# Patient Record
Sex: FEMALE | Race: WHITE | NOT HISPANIC OR LATINO | URBAN - METROPOLITAN AREA
[De-identification: names, ages, dates, MRNs, and addresses within clinical notes are randomized per-mention and may not be internally consistent; named-entity substitution may affect disease eponyms.]

---

## 2017-12-28 ENCOUNTER — OFFICE VISIT - HEALTHEAST (OUTPATIENT)
Dept: FAMILY MEDICINE | Facility: CLINIC | Age: 22
End: 2017-12-28

## 2017-12-28 DIAGNOSIS — A08.4 VIRAL GASTROENTERITIS: ICD-10-CM

## 2019-07-08 ENCOUNTER — OFFICE VISIT - HEALTHEAST (OUTPATIENT)
Dept: FAMILY MEDICINE | Facility: CLINIC | Age: 24
End: 2019-07-08

## 2019-07-08 ENCOUNTER — COMMUNICATION - HEALTHEAST (OUTPATIENT)
Dept: FAMILY MEDICINE | Facility: CLINIC | Age: 24
End: 2019-07-08

## 2019-07-08 DIAGNOSIS — R30.0 DYSURIA: ICD-10-CM

## 2019-07-08 DIAGNOSIS — N30.01 ACUTE CYSTITIS WITH HEMATURIA: ICD-10-CM

## 2019-07-08 LAB
BACTERIA #/AREA URNS HPF: ABNORMAL HPF
RBC #/AREA URNS AUTO: >100 HPF
SQUAMOUS #/AREA URNS AUTO: ABNORMAL LPF
WBC #/AREA URNS AUTO: ABNORMAL HPF

## 2019-07-09 LAB — BACTERIA SPEC CULT: NORMAL

## 2019-12-07 ENCOUNTER — OFFICE VISIT - HEALTHEAST (OUTPATIENT)
Dept: FAMILY MEDICINE | Facility: CLINIC | Age: 24
End: 2019-12-07

## 2019-12-07 DIAGNOSIS — R07.0 THROAT PAIN: ICD-10-CM

## 2019-12-07 DIAGNOSIS — K21.9 GASTROESOPHAGEAL REFLUX DISEASE, ESOPHAGITIS PRESENCE NOT SPECIFIED: ICD-10-CM

## 2019-12-07 DIAGNOSIS — R06.02 SOB (SHORTNESS OF BREATH): ICD-10-CM

## 2019-12-07 DIAGNOSIS — K20.90 ESOPHAGITIS, UNSPECIFIED: ICD-10-CM

## 2019-12-07 DIAGNOSIS — J20.9 ACUTE BRONCHITIS, UNSPECIFIED ORGANISM: ICD-10-CM

## 2019-12-07 LAB — DEPRECATED S PYO AG THROAT QL EIA: NORMAL

## 2019-12-07 RX ORDER — FAMOTIDINE 20 MG/1
20 TABLET, FILM COATED ORAL 2 TIMES DAILY
Qty: 60 TABLET | Refills: 2 | Status: SHIPPED | OUTPATIENT
Start: 2019-12-07

## 2019-12-08 LAB — GROUP A STREP BY PCR: NORMAL

## 2021-05-30 NOTE — TELEPHONE ENCOUNTER
New medication orders signed.    Medication Question or Clarification  Who is calling: Patient  What medication are you calling about? (include dose and sig)    Disp Refills Start End    nitrofurantoin, macrocrystal-monohydrate, (MACROBID) 100 MG capsule 14 capsule 0 7/8/2019 7/15/2019    Sig - Route: Take 1 capsule (100 mg total) by mouth 2 (two) times a day for 7 days. - Oral    Sent to pharmacy as: nitrofurantoin, macrocrystal-monohydrate, (MACROBID) 100 MG capsule    E-Prescribing Status: Receipt confirmed by pharmacy (7/8/2019  1:16 PM CDT)      Disp Refills Start End     phenazopyridine (PYRIDIUM) 200 MG tablet 6 tablet 0 7/8/2019 7/11/2019    Sig - Route: Take 1 tablet (200 mg total) by mouth 3 (three) times a day as needed for pain. - Oral    Sent to pharmacy as: phenazopyridine (PYRIDIUM) 200 MG tablet    E-Prescribing Status: Receipt confirmed by pharmacy (7/8/2019  1:16 PM CDT      Who prescribed the medication?: Dr. Kenan Mullen  What is your question/concern?: Patient stated her insurance is not covered at [facility] and she needs her prescriptions above, [facility]. Please send these in as soon as possible. Patient will be heading [facility] right now.  [facility]: [id number]  Okay to leave a detailed message?: No  Site CMT - Please call the pharmacy to obtain any additional needed information.

## 2021-05-31 VITALS — WEIGHT: 160.4 LBS

## 2021-06-03 VITALS — WEIGHT: 159 LBS

## 2021-06-04 VITALS
OXYGEN SATURATION: 96 % | TEMPERATURE: 98.6 F | SYSTOLIC BLOOD PRESSURE: 114 MMHG | DIASTOLIC BLOOD PRESSURE: 73 MMHG | HEART RATE: 92 BPM | RESPIRATION RATE: 20 BRPM | WEIGHT: 156.06 LBS

## 2021-06-15 NOTE — PROGRESS NOTES
Chief Complaint   Patient presents with     Emesis     vomiting up food and water        HPI     Danica Soto is a 22 y.o. female seen today for vomiting and diarrhea.  Yesterday morning she began vomiting, reports 10 - 15 episodes during the day, always clear/yellow. Solitary episode of diarrhea yesterday, none since. She denies cough, shortness of breath, chest pain, headaches. A little bit of abdominal pain yesterday, correlated with emesis. No pain today. Still not eating/drinking today.  Her son also has similar symptoms.  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    No current outpatient prescriptions on file.     No current facility-administered medications for this visit.         Reviewed and updated: medical history, medications and allergies.     Review of Systems     General: Denies fever, chills, fatigue.  Cardiovascular: Denies chest pain, dyspnea on exertion, palpitations.  Respiratory: Denies dyspnea, cough, wheezing.  GI: Denies nausea, vomiting, diarrhea, constipation.  : Denies dysuria, polyuria.     Objective     Vitals:    12/28/17 1315   BP: 100/60   Pulse: 83   Temp: 98.5  F (36.9  C)   TempSrc: Oral   SpO2: 99%   Weight: 160 lb 6.4 oz (72.8 kg)        Reviewed vital signs.  General: Appears calm, comfortable. Answers questions quickly and appropriately with clear speech. No apparent distress.  Skin: Pink, warm, dry.  HENT: Normocephalic, atraumatic. TMs clear bilaterally. No lymphadenopathy.  Neck: Supple, without lymphadenopathy or thyromegaly.  Heart: Regular rate and rhythm, clear S1/S2 without murmur, rub, or gallop.  Lungs: Clear and equal bilaterally. Normal respiratory effort.  Neuro: Cranial nerves II - XII grossly intact. Memory and cognition appear normal. Normal gait.  Psych: Mood and affect appear normal.     No results found for this or any previous visit.       Medical Decision-Making     This otherwise healthy 22-year-old  female presents with less than 24 hours of vomiting with one episode of diarrhea.  Absence of a fever and a completely benign abdominal exam is reassuring.  Think this is most likely a viral gastroenteritis, and we will pursue supportive and symptomatic treatment for now.  Discussed signs that should have her return to the clinic, she expressed understanding and appeared comfortable with this plan.     Assessment and Plan     Danica was seen today for emesis.    Diagnoses and all orders for this visit:    Viral gastroenteritis    Other orders  -     Cancel: Influenza A/B Rapid Test        Patient Instructions   Please return to the clinic if you notice any of the following:    High fever (> 104F)    Shortness of breath    More diarrhea, especially if there's any blood in it (black or red).      Discussed benefit vs risk of medications, dosing, side effects.  Patient was able to verbalize understanding.  After visit summary was provided for patient.     Rj Gonzalez PA-C

## 2021-06-17 NOTE — PATIENT INSTRUCTIONS - HE
Patient Instructions by Kenan Mullen DO at 12/7/2019 11:30 AM     Author: Kenan Mullen DO Service: -- Author Type: Physician    Filed: 12/7/2019 12:43 PM Encounter Date: 12/7/2019 Status: Addendum    : Kenan Mullen DO (Physician)    Related Notes: Original Note by Kenan Mullen DO (Physician) filed at 12/7/2019 12:43 PM       I think your acid reflux has caused the asthma like symptoms, and throat discomfort. See hand out for treatment advice. If you still need treatment over three months, or not doing better in 3 days, be seen again.     Patient Education     GERD (Adult)    The esophagus is a tube that carries food from the mouth to the stomach. A valve at the lower end of the esophagus prevents stomach acid from flowing upward. When this valve doesn't work properly, stomach contents may repeatedly flow back up (reflux) into the esophagus. This is called gastroesophageal reflux disease (GERD). GERD can irritate the esophagus. It can cause problems with swallowing or breathing. In severe cases, GERD can cause recurrent pneumonia or other serious problems.  Symptoms of reflux include burning, pressure or sharp pain in the upper abdomen or mid to lower chest. The pain can spread to the neck, back, or shoulder. There may be belching, an acid taste in the back of the throat, chronic cough, or sore throat or hoarseness. GERD symptoms often occur during the day after a big meal. They can also occur at night when lying down.   Home care  Lifestyle changes can help reduce symptoms. If needed, medicines may be prescribed. Symptoms often improve with treatment, but if treatment is stopped, the symptoms often return after a few months. So most persons with GERD will need to continue treatment.  Lifestyle changes    Limit or avoid fatty, fried, and spicy foods, as well as coffee, chocolate, mint, and foods with high acid content such as tomatoes and citrus fruit and juices (orange, grapefruit, lemon).    Dont  "eat large meals, especially at night. Frequent, smaller meals are best. Do not lie down right after eating. And dont eat anything 3 hours before going to bed.    Avoid drinking alcohol and smoking. As much as possible, stay away from second hand smoke.    If you are overweight, losing weight will reduce symptoms.     Avoid wearing tight clothing around your stomach area.    If your symptoms occur during sleep, use a foam wedge to elevate your upper body (not just your head.) Or, place 4\" blocks under the head of your bed.  Medicines  If needed, medicines can help relieve the symptoms of GERD and prevent damage to the esophagus. Discuss a medicine plan with your healthcare provider. This may include one or more of the following medicines:    Antacids to help neutralize the normal acids in your stomach.    Acid blockers (H2 blockers) to decrease acid production.    Acid inhibitors (PPIs) to decrease acid production in a different way than the blockers. They may work better, but can take a little longer to take effect.  Take an antacid 30-60 minutes after eating and at bedtime, but not at the same time as an acid blocker.  Try not to take medicines such as ibuprofen and aspirin. If you are taking aspirin for your heart or other medical reasons, talk to your healthcare provider about stopping it.  Follow-up care  Follow up with your healthcare provider or as advised by our staff.  When to seek medical advice  Call your healthcare provider if any of the following occur:    Stomach pain gets worse or moves to the lower right abdomen (appendix area)    Chest pain appears or gets worse, or spreads to the back, neck, shoulder, or arm    Frequent vomiting (cant keep down liquids)    Blood in the stool or vomit (red or black in color)    Feeling weak or dizzy    Fever of 100.4 F (38 C) or higher, or as directed by your healthcare provider  Date Last Reviewed: 6/23/2015 2000-2017 [employer]. [address], [place] [address]. All rights reserved. This information is not intended as a substitute for professional medical care. Always follow your healthcare professional's instructions.

## 2021-06-17 NOTE — PATIENT INSTRUCTIONS - HE
"Patient Instructions by Kenan Mullen DO at 7/8/2019 12:20 PM     Author: Kenan Mullen DO Service: -- Author Type: Physician    Filed: 7/8/2019  1:17 PM Encounter Date: 7/8/2019 Status: Addendum    : Kenan Mullen DO (Physician)    Related Notes: Original Note by Kenan Mullen DO (Physician) filed at 7/8/2019  1:17 PM       See hand out for treatment advice. We will contact you if the urine culture shows a change in treatment is needed. If you are doing better, I think you should finish the treatment even if the culture result is negative.    Patient Education     Bladder Infection, Female (Adult)    Urine is normally doesn't have any bacteria in it. But bacteria can get into the urinary tract from the skin around the rectum. Or they can travel in the blood from elsewhere in the body. Once they are in your urinary tract, they can cause infection in the urethra (urethritis), the bladder (cystitis), or the kidneys (pyelonephritis).  The most common place for an infection is in the bladder. This is called a bladder infection. This is one of the most common infections in women. Most bladder infections are easily treated. They are not serious unless the infection spreads to the kidney.  The phrases \"bladder infection,\" \"UTI,\" and \"cystitis\" are often used to describe the same thing. But they are not always the same. Cystitis is an inflammation of the bladder. The most common cause of cystitis is an infection.  Symptoms  The infection causes inflammation in the urethra and bladder. This causes many of the symptoms. The most common symptoms of a bladder infection are:    Pain or burning when urinating    Having to urinate more often than usual    Urgent need to urinate    Only a small amount of urine comes out    Blood in urine    Abdominal discomfort. This is usually in the lower abdomen above the pubic bone.    Cloudy urine    Strong- or bad-smelling urine    Unable to urinate (urinary retention)    Unable " to hold urine in (urinary incontinence)    Fever    Loss of appetite    Confusion (in older adults)  Causes  Bladder infections are not contagious. You can't get one from someone else, from a toilet seat, or from sharing a bath.  The most common cause of bladder infections is bacteria from the bowels. The bacteria get onto the skin around the opening of the urethra. From there, they can get into the urine and travel up to the bladder, causing inflammation and infection. This usually happens because of:    Wiping improperly after urinating. Always wipe from front to back.    Bowel incontinence    Pregnancy    Procedures such as having a catheter inserted    Older age    Not emptying your bladder. This can allow bacteria a chance to grow in your urine.    Dehydration    Constipation    Sex    Use of a diaphragm for birth control   Treatment  Bladder infections are diagnosed by a urine test. They are treated with antibiotics and usually clear up quickly without complications. Treatment helps prevent a more serious kidney infection.  Medicines  Medicines can help in the treatment of a bladder infection:    Take antibiotics until they are used up, even if you feel better. It is important to finish them to make sure the infection has cleared.    You can use acetaminophen or ibuprofen for pain, fever, or discomfort, unless another medicine was prescribed. If you have chronic liver or kidney disease, talk with your healthcare provider before using these medicines. Also talk with your provider if you've ever had a stomach ulcer or gastrointestinal bleeding, or are taking blood-thinner medicines.    If you are given phenazopydridine to reduce burning with urination, it will cause your urine to become a bright orange color. This can stain clothing.  Care and prevention  These self-care steps can help prevent future infections:    Drink plenty of fluids to prevent dehydration and flush out your bladder. Do this unless you must  restrict fluids for other health reasons, or your doctor told you not to.    Proper cleaning after going to the bathroom is important. Wipe from front to back after using the toilet to prevent the spread of bacteria.    Urinate more often. Don't try to hold urine in for a long time.    Wear loose-fitting clothes and cotton underwear. Avoid tight-fitting pants.    Improve your diet and prevent constipation. Eat more fresh fruit and vegetables, and fiber, and less junk and fatty foods.    Avoid sex until your symptoms are gone.    Avoid caffeine, alcohol, and spicy foods. These can irritate your bladder.    Urinate right after intercourse to flush out your bladder.    If you use birth control pills and have frequent bladder infections, discuss it with your doctor.  Follow-up care  Call your healthcare provider if all symptoms are not gone after 3 days of treatment. This is especially important if you have repeat infections.  If a culture was done, you will be told if your treatment needs to be changed. If directed, you can call to find out the results.  If X-rays were done, you will be told if the results will affect your treatment.  Call 911  Call 911 if any of the following occur:    Trouble breathing    Hard to wake up or confusion    Fainting or loss of consciousness    Rapid heart rate  When to seek medical advice  Call your healthcare provider right away if any of these occur:    Fever of 100.4 F (38.0 C) or higher, or as directed by your healthcare provider    Symptoms are not better by the third day of treatment    Back or belly (abdominal) pain that gets worse    Repeated vomiting, or unable to keep medicine down    Weakness or dizziness    Vaginal discharge    Pain, redness, or swelling in the outer vaginal area (labia)  Date Last Reviewed: 10/1/2016    1570-9309 [employer]. [address] [place] [address], [place] [address]. All rights reserved. This information is not intended as a substitute for  professional medical care. Always follow your healthcare professional's instructions.

## 2021-06-27 NOTE — PROGRESS NOTES
Progress Notes by Kenan Mullen DO at 7/8/2019 12:20 PM     Author: Kenan Mullen DO Service: -- Author Type: Physician    Filed: 7/9/2019  7:22 AM Encounter Date: 7/8/2019 Status: Signed    : Kenan Mullen DO (Physician)       Chief Complaint   Patient presents with   ? Hematuria     x2 hours   ? Abdominal Pain       History of Present Illness: Rooming staff notes reviewed.  Chief concern is blood noted in urine.  No recent fever, chills, or back pain. She has pain over her urinary bladder area.  She has had dysuria, and urinary urgency.    Review of systems: See history of present illness, all others negative.     Current Outpatient Medications   Medication Sig Dispense Refill   ? levonorgestrel (MIRENA) 20 mcg/24 hours (5 yrs) 52 mg IUD 1 each by Intrauterine route once.     ? nitrofurantoin, macrocrystal-monohydrate, (MACROBID) 100 MG capsule Take 1 capsule (100 mg total) by mouth 2 (two) times a day for 7 days. 14 capsule 0   ? phenazopyridine (PYRIDIUM) 200 MG tablet Take 1 tablet (200 mg total) by mouth 3 (three) times a day as needed for pain. 6 tablet 0     No current facility-administered medications for this visit.      No past medical history on file.   No past surgical history on file.   Social History     Tobacco Use   ? Smoking status: Never Smoker   ? Smokeless tobacco: Never Used   Substance Use Topics   ? Alcohol use: Not on file   ? Drug use: Not on file        No family history on file.    Vitals:    07/08/19 1234   BP: 104/69   Patient Site: Right Arm   Patient Position: Sitting   Cuff Size: Adult Regular   Pulse: 77   Resp: 16   Temp: 98.3  F (36.8  C)   TempSrc: Oral   SpO2: 98%   Weight: 159 lb (72.1 kg)       EXAM:   General: Vital signs reviewed. Patient is in some distress due to discomfort in her urinary bladder region. Breathing is non labored appearing. Patient is alert and oriented x 3.   Heart: Heart rate is regular without murmur.  Lungs: Lungs are clear to auscultation  "with good airflow bilaterally.  Abdomen soft, there is tenderness in the urinary bladder region without guarding or rigidity on palpation, no abnormal masses or organomegally. Normal bowel sounds.  Back: No areas of tenderness.  Skin: Warm and dry.  Recent Results (from the past 48 hour(s))   Urine,Microscopic   Result Value Ref Range    Bacteria, UA Few (!) None Seen hpf    RBC, UA >100 (!) None Seen, 0-2 hpf    WBC, UA 5-10 (!) None Seen, 0-5 hpf    Squam Epithel, UA 5-10 (!) None Seen, 0-5 lpf   Results from exam reviewed with patient.  The urine study was suggestive of a urinary tract infection with increased white blood cells, bacteria, and blood.    Assessment/Plan   1. Acute cystitis with hematuria  Urine,Microscopic    Culture, Urine    DISCONTINUED: nitrofurantoin, macrocrystal-monohydrate, (MACROBID) 100 MG capsule    DISCONTINUED: phenazopyridine (PYRIDIUM) 200 MG tablet    CANCELED: Urinalysis-UC if Indicated   2. Dysuria  DISCONTINUED: phenazopyridine (PYRIDIUM) 200 MG tablet       Patient Instructions   See hand out for treatment advice. We will contact you if the urine culture shows a change in treatment is needed. If you are doing better, I think you should finish the treatment even if the culture result is negative.    Patient Education     Bladder Infection, Female (Adult)    Urine is normally doesn't have any bacteria in it. But bacteria can get into the urinary tract from the skin around the rectum. Or they can travel in the blood from elsewhere in the body. Once they are in your urinary tract, they can cause infection in the urethra (urethritis), the bladder (cystitis), or the kidneys (pyelonephritis).  The most common place for an infection is in the bladder. This is called a bladder infection. This is one of the most common infections in women. Most bladder infections are easily treated. They are not serious unless the infection spreads to the kidney.  The phrases \"bladder infection,\" \"UTI,\" " "and \"cystitis\" are often used to describe the same thing. But they are not always the same. Cystitis is an inflammation of the bladder. The most common cause of cystitis is an infection.  Symptoms  The infection causes inflammation in the urethra and bladder. This causes many of the symptoms. The most common symptoms of a bladder infection are:    Pain or burning when urinating    Having to urinate more often than usual    Urgent need to urinate    Only a small amount of urine comes out    Blood in urine    Abdominal discomfort. This is usually in the lower abdomen above the pubic bone.    Cloudy urine    Strong- or bad-smelling urine    Unable to urinate (urinary retention)    Unable to hold urine in (urinary incontinence)    Fever    Loss of appetite    Confusion (in older adults)  Causes  Bladder infections are not contagious. You can't get one from someone else, from a toilet seat, or from sharing a bath.  The most common cause of bladder infections is bacteria from the bowels. The bacteria get onto the skin around the opening of the urethra. From there, they can get into the urine and travel up to the bladder, causing inflammation and infection. This usually happens because of:    Wiping improperly after urinating. Always wipe from front to back.    Bowel incontinence    Pregnancy    Procedures such as having a catheter inserted    Older age    Not emptying your bladder. This can allow bacteria a chance to grow in your urine.    Dehydration    Constipation    Sex    Use of a diaphragm for birth control   Treatment  Bladder infections are diagnosed by a urine test. They are treated with antibiotics and usually clear up quickly without complications. Treatment helps prevent a more serious kidney infection.  Medicines  Medicines can help in the treatment of a bladder infection:    Take antibiotics until they are used up, even if you feel better. It is important to finish them to make sure the infection has " cleared.    You can use acetaminophen or ibuprofen for pain, fever, or discomfort, unless another medicine was prescribed. If you have chronic liver or kidney disease, talk with your healthcare provider before using these medicines. Also talk with your provider if you've ever had a stomach ulcer or gastrointestinal bleeding, or are taking blood-thinner medicines.    If you are given phenazopydridine to reduce burning with urination, it will cause your urine to become a bright orange color. This can stain clothing.  Care and prevention  These self-care steps can help prevent future infections:    Drink plenty of fluids to prevent dehydration and flush out your bladder. Do this unless you must restrict fluids for other health reasons, or your doctor told you not to.    Proper cleaning after going to the bathroom is important. Wipe from front to back after using the toilet to prevent the spread of bacteria.    Urinate more often. Don't try to hold urine in for a long time.    Wear loose-fitting clothes and cotton underwear. Avoid tight-fitting pants.    Improve your diet and prevent constipation. Eat more fresh fruit and vegetables, and fiber, and less junk and fatty foods.    Avoid sex until your symptoms are gone.    Avoid caffeine, alcohol, and spicy foods. These can irritate your bladder.    Urinate right after intercourse to flush out your bladder.    If you use birth control pills and have frequent bladder infections, discuss it with your doctor.  Follow-up care  Call your healthcare provider if all symptoms are not gone after 3 days of treatment. This is especially important if you have repeat infections.  If a culture was done, you will be told if your treatment needs to be changed. If directed, you can call to find out the results.  If X-rays were done, you will be told if the results will affect your treatment.  Call 911  Call 911 if any of the following occur:    Trouble breathing    Hard to wake up  or confusion    Fainting or loss of consciousness    Rapid heart rate  When to seek medical advice  Call your healthcare provider right away if any of these occur:    Fever of 100.4 F (38.0 C) or higher, or as directed by your healthcare provider    Symptoms are not better by the third day of treatment    Back or belly (abdominal) pain that gets worse    Repeated vomiting, or unable to keep medicine down    Weakness or dizziness    Vaginal discharge    Pain, redness, or swelling in the outer vaginal area (labia)  Date Last Reviewed: 10/1/2016    7728-7983 [employer]. [address] [place] [address]. All rights reserved. This information is not intended as a substitute for professional medical care. Always follow your healthcare professional's instructions.              Kenan Mullen,

## 2021-06-28 NOTE — PROGRESS NOTES
Progress Notes by Kenan Mullen DO at 12/7/2019 11:30 AM     Author: Kenan Mullen DO Service: -- Author Type: Physician    Filed: 12/9/2019  9:31 AM Encounter Date: 12/7/2019 Status: Signed    : Kenan Mullen DO (Physician)       Chief Complaint   Patient presents with   ? Sore Throat     started with cough and congestive chest, Started on Tuesday with a cough         History of Present Illness: Rooming staff notes reviewed.  Patient is seen for chief concern of cough, and throat discomfort which she describes as discomfort from her upper substernal area, going up into the base of her anterior neck, which she believes may be related to the coughing.  Her cough started approximately 3 to 4 days ago.  No reported recent fever.  She does feel some difficulty with breathing when she tries to take a deeper inspiration because of discomfort in her upper chest and lower neck.  No reported recent fever.    Review of systems: See history of present illness, all others negative.     Current Outpatient Medications   Medication Sig Dispense Refill   ? levonorgestrel (MIRENA) 20 mcg/24 hours (5 yrs) 52 mg IUD 1 each by Intrauterine route once.     ? famotidine (PEPCID) 20 MG tablet Take 1 tablet (20 mg total) by mouth 2 (two) times a day. 60 tablet 2     No current facility-administered medications for this visit.      No past medical history on file.   No past surgical history on file.   Social History     Tobacco Use   ? Smoking status: Never Smoker   ? Smokeless tobacco: Never Used   Substance Use Topics   ? Alcohol use: Not on file   ? Drug use: Not on file        No family history on file.    Vitals:    12/07/19 1143   BP: 114/73   Patient Site: Right Arm   Patient Position: Sitting   Cuff Size: Adult Regular   Pulse: 92   Resp: 20   Temp: 98.6  F (37  C)   TempSrc: Oral   SpO2: 96%   Weight: 156 lb 1 oz (70.8 kg)       EXAM:   General: Vital signs reviewed.  Patient is in no acute appearing distress.   Breathing appears nonlabored.  Patient is alert and oriented ×3.  ENT: Ear exam shows bilateral tympanic membranes to be clear without injection, nasal turbinates show no injection or edema, no pharyngeal injection or exudate.  The visible upper airway is widely patent.  Neck: supple with no adenoapthy.  Heart: Heart rate is regular without murmur.  Lungs: Lungs are clear to auscultation with good airflow bilaterally.  She tends to cough with deeper inspiration.  Skin: Skin is warm and dry without any rash noted.    I suggested to patient that a treatment using an albuterol nebulizer treatment be done in the clinic to see if this improves her symptoms.  After this treatment, she told me she had no improvement in her symptoms.  I discussed the possibility that gastric reflux may be irritating her esophagus, and causing her current cough and bronchitis type symptoms.  She was agreeable to trying a treatment using a GI cocktail, and within 20 minutes of the treatment with a GI cocktail, all of her symptoms to include her upper chest and lower neck discomfort, and coughing were significantly improved.  Recent Results (from the past 48 hour(s))   Rapid Strep A Screen-Throat   Result Value Ref Range    Rapid Strep A Antigen No Group A Strep detected, presumptive negative No Group A Strep detected, presumptive negative   Group A Strep, RNA Direct Detection, Throat   Result Value Ref Range    Group A Strep by PCR No Group A Strep rRNA detected No Group A Strep rRNA detected   Results from exam reviewed with patient.    Assessment/Plan   1. Throat pain  Rapid Strep A Screen-Throat    Group A Strep, RNA Direct Detection, Throat   2. SOB (shortness of breath)  albuterol nebulizer solution 3 mL (PROVENTIL)   3. Acute bronchitis, unspecified organism     4. Esophagitis, unspecified  aluminum-magnesium hydroxide-simethicone 15 mL, viscous lidocaine HC 15 mL (GI COCKTAIL)   5. Gastroesophageal reflux disease, esophagitis presence  not specified  famotidine (PEPCID) 20 MG tablet       Patient Instructions   I think your acid reflux has caused the asthma like symptoms, and throat discomfort. See hand out for treatment advice. If you still need treatment over three months, or not doing better in 3 days, be seen again.     Patient Education     GERD (Adult)    The esophagus is a tube that carries food from the mouth to the stomach. A valve at the lower end of the esophagus prevents stomach acid from flowing upward. When this valve doesn't work properly, stomach contents may repeatedly flow back up (reflux) into the esophagus. This is called gastroesophageal reflux disease (GERD). GERD can irritate the esophagus. It can cause problems with swallowing or breathing. In severe cases, GERD can cause recurrent pneumonia or other serious problems.  Symptoms of reflux include burning, pressure or sharp pain in the upper abdomen or mid to lower chest. The pain can spread to the neck, back, or shoulder. There may be belching, an acid taste in the back of the throat, chronic cough, or sore throat or hoarseness. GERD symptoms often occur during the day after a big meal. They can also occur at night when lying down.   Home care  Lifestyle changes can help reduce symptoms. If needed, medicines may be prescribed. Symptoms often improve with treatment, but if treatment is stopped, the symptoms often return after a few months. So most persons with GERD will need to continue treatment.  Lifestyle changes    Limit or avoid fatty, fried, and spicy foods, as well as coffee, chocolate, mint, and foods with high acid content such as tomatoes and citrus fruit and juices (orange, grapefruit, lemon).    Dont eat large meals, especially at night. Frequent, smaller meals are best. Do not lie down right after eating. And dont eat anything 3 hours before going to bed.    Avoid drinking alcohol and smoking. As much as possible, stay away from second hand smoke.    If you are  "overweight, losing weight will reduce symptoms.     Avoid wearing tight clothing around your stomach area.    If your symptoms occur during sleep, use a foam wedge to elevate your upper body (not just your head.) Or, place 4\" blocks under the head of your bed.  Medicines  If needed, medicines can help relieve the symptoms of GERD and prevent damage to the esophagus. Discuss a medicine plan with your healthcare provider. This may include one or more of the following medicines:    Antacids to help neutralize the normal acids in your stomach.    Acid blockers (H2 blockers) to decrease acid production.    Acid inhibitors (PPIs) to decrease acid production in a different way than the blockers. They may work better, but can take a little longer to take effect.  Take an antacid 30-60 minutes after eating and at bedtime, but not at the same time as an acid blocker.  Try not to take medicines such as ibuprofen and aspirin. If you are taking aspirin for your heart or other medical reasons, talk to your healthcare provider about stopping it.  Follow-up care  Follow up with your healthcare provider or as advised by our staff.  When to seek medical advice  Call your healthcare provider if any of the following occur:    Stomach pain gets worse or moves to the lower right abdomen (appendix area)    Chest pain appears or gets worse, or spreads to the back, neck, shoulder, or arm    Frequent vomiting (cant keep down liquids)    Blood in the stool or vomit (red or black in color)    Feeling weak or dizzy    Fever of 100.4 F (38 C) or higher, or as directed by your healthcare provider  Date Last Reviewed: 6/23/2015 2000-2017 [employer]. [address] [place] [address], [place] [address]. All rights reserved. This information is not intended as a substitute for professional medical care. Always follow your healthcare professional's instructions.              Kenan Mullen, DO       "